# Patient Record
Sex: MALE | Race: WHITE | NOT HISPANIC OR LATINO | Employment: OTHER | URBAN - METROPOLITAN AREA
[De-identification: names, ages, dates, MRNs, and addresses within clinical notes are randomized per-mention and may not be internally consistent; named-entity substitution may affect disease eponyms.]

---

## 2021-03-03 DIAGNOSIS — Z23 NEED FOR VACCINATION: ICD-10-CM

## 2021-10-10 ENCOUNTER — APPOINTMENT (OUTPATIENT)
Dept: RADIOLOGY | Facility: MEDICAL CENTER | Age: 66
End: 2021-10-10
Attending: EMERGENCY MEDICINE
Payer: MEDICARE

## 2021-10-10 ENCOUNTER — HOSPITAL ENCOUNTER (EMERGENCY)
Facility: MEDICAL CENTER | Age: 66
End: 2021-10-10
Attending: EMERGENCY MEDICINE
Payer: MEDICARE

## 2021-10-10 VITALS
DIASTOLIC BLOOD PRESSURE: 78 MMHG | TEMPERATURE: 97.9 F | HEART RATE: 84 BPM | BODY MASS INDEX: 32.18 KG/M2 | WEIGHT: 205.03 LBS | SYSTOLIC BLOOD PRESSURE: 135 MMHG | RESPIRATION RATE: 18 BRPM | OXYGEN SATURATION: 98 % | HEIGHT: 67 IN

## 2021-10-10 DIAGNOSIS — S39.012A STRAIN OF LUMBAR REGION, INITIAL ENCOUNTER: ICD-10-CM
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
## 2021-10-10 DIAGNOSIS — S09.90XA CLOSED HEAD INJURY, INITIAL ENCOUNTER: ICD-10-CM

## 2021-10-10 DIAGNOSIS — S42.294A OTHER CLOSED NONDISPLACED FRACTURE OF PROXIMAL END OF RIGHT HUMERUS, INITIAL ENCOUNTER: ICD-10-CM

## 2021-10-10 PROCEDURE — 70450 CT HEAD/BRAIN W/O DYE: CPT | Mod: ME

## 2021-10-10 PROCEDURE — 700102 HCHG RX REV CODE 250 W/ 637 OVERRIDE(OP): Performed by: EMERGENCY MEDICINE

## 2021-10-10 PROCEDURE — A9270 NON-COVERED ITEM OR SERVICE: HCPCS | Performed by: EMERGENCY MEDICINE

## 2021-10-10 PROCEDURE — 73030 X-RAY EXAM OF SHOULDER: CPT | Mod: RT

## 2021-10-10 PROCEDURE — 99284 EMERGENCY DEPT VISIT MOD MDM: CPT

## 2021-10-10 PROCEDURE — 72131 CT LUMBAR SPINE W/O DYE: CPT | Mod: ME

## 2021-10-10 PROCEDURE — 72125 CT NECK SPINE W/O DYE: CPT | Mod: ME

## 2021-10-10 PROCEDURE — 73060 X-RAY EXAM OF HUMERUS: CPT | Mod: RT

## 2021-10-10 RX ORDER — HYDROCODONE BITARTRATE AND ACETAMINOPHEN 5; 325 MG/1; MG/1
1 TABLET ORAL ONCE
Status: COMPLETED | OUTPATIENT
Start: 2021-10-10 | End: 2021-10-10

## 2021-10-10 RX ADMIN — HYDROCODONE BITARTRATE AND ACETAMINOPHEN 1 TABLET: 5; 325 TABLET ORAL at 21:39

## 2021-10-10 ASSESSMENT — PAIN DESCRIPTION - PAIN TYPE: TYPE: ACUTE PAIN

## 2021-10-11 NOTE — ED TRIAGE NOTES
"Presents via EMS.  Earlier today while carrying groceries in his house, he lost his footing falling on linoleum marianne.  He C/O left shoulder (total joint replacement in 1997) pain, and occipital head I jury.  Chief Complaint   Patient presents with   • T-5000 FALL   • Shoulder Injury   • Head Injury     BP (!) 169/92   Pulse 62   Temp 36.7 °C (98 °F) (Temporal)   Resp 18   Ht 1.702 m (5' 7\")   Wt 93 kg (205 lb 0.4 oz)   SpO2 97%   BMI 32.11 kg/m²   Has this patient been vaccinated for COVID NO  If not, would they like to be vaccinated while in the ER if eligible?  NO  Would the patient like to speak with the ERP about the possibility of receiving the COVID vaccine today before making a decision? NO    "

## 2021-10-11 NOTE — ED PROVIDER NOTES
ED Provider Note  CHIEF COMPLAINT  Chief Complaint   Patient presents with   • T-5000 FALL   • Shoulder Injury   • Head Injury       HPI  Deshawn Butts is a 66 y.o. male who presents to the ER complaining of right shoulder pain, head injury, neck pain and low back pain after a ground-level fall today while carrying groceries into his house.  The patient states he has chronic low back pain due to multiple bulging disc.  Sometimes his leg gives out on him.  He was carrying groceries in for his wife.  His finger got caught on the door handle.  He twisted wrong.  His leg went out from under him.  He fell backwards onto his back, striking his head.  No LOC.  Is not on any blood thinners.  He has a mild headache.  He complains of some neck pain.  He also has disc disease in his neck at baseline.  Additionally, he has history of shoulder replacement surgery.  He recalls when he fell today that his arm went up above his head.  He was told by his orthopedic surgeon that he should never let his arm go above his head.  He arrives in a sling.  EMS was called to the house as he was unable to get up on his own.  Once he was up, however, he was able to walk.  No hip pain.  He says his back is bothering him and it seems to be a little worse than his baseline.  No radiating pain down the legs.  No new numbness, tingling or weakness of the legs.  No loss of bowel or bladder control.  No abdominal pain.  No rib pain.  No trouble breathing.    REVIEW OF SYSTEMS  See HPI for further details. All other systems are negative.    PAST MEDICAL HISTORY  History reviewed. No pertinent past medical history.    FAMILY HISTORY  History reviewed. No pertinent family history.    SOCIAL HISTORY  Social History     Socioeconomic History   • Marital status:      Spouse name: Not on file   • Number of children: Not on file   • Years of education: Not on file   • Highest education level: Not on file   Occupational History   • Not on file  "  Tobacco Use   • Smoking status: Former Smoker   • Smokeless tobacco: Never Used   Substance and Sexual Activity   • Alcohol use: Not Currently   • Drug use: Never   • Sexual activity: Not on file   Other Topics Concern   • Not on file   Social History Narrative   • Not on file     Social Determinants of Health     Financial Resource Strain:    • Difficulty of Paying Living Expenses:    Food Insecurity:    • Worried About Running Out of Food in the Last Year:    • Ran Out of Food in the Last Year:    Transportation Needs:    • Lack of Transportation (Medical):    • Lack of Transportation (Non-Medical):    Physical Activity:    • Days of Exercise per Week:    • Minutes of Exercise per Session:    Stress:    • Feeling of Stress :    Social Connections:    • Frequency of Communication with Friends and Family:    • Frequency of Social Gatherings with Friends and Family:    • Attends Congregation Services:    • Active Member of Clubs or Organizations:    • Attends Club or Organization Meetings:    • Marital Status:    Intimate Partner Violence:    • Fear of Current or Ex-Partner:    • Emotionally Abused:    • Physically Abused:    • Sexually Abused:        SURGICAL HISTORY  History reviewed. No pertinent surgical history.    CURRENT MEDICATIONS  Home Medications     Reviewed by Carlos Hayes R.N. (Registered Nurse) on 10/10/21 at 1803  Med List Status: Not Addressed   Medication Last Dose Status        Patient Poncho Taking any Medications                       ALLERGIES  No Known Allergies    PHYSICAL EXAM  VITAL SIGNS: BP (!) 169/92   Pulse 62   Temp 36.7 °C (98 °F) (Temporal)   Resp 18   Ht 1.702 m (5' 7\")   Wt 93 kg (205 lb 0.4 oz)   SpO2 97%   BMI 32.11 kg/m²      Constitutional: Well developed, well nourished; No acute distress; Non-toxic appearance.   HENT: Normocephalic, atraumatic; Bilateral external ears normal;  Oropharyngeal examination deferred due to COVID-19 outbreak and lack of oropharyngeal " complaint  Eyes: PERRL, EOMI, Conjunctiva normal. No discharge.   Neck:  Supple, nontender midline C-spine without step-off or deformity.  There is some mild bilateral paraspinous muscle tenderness; No stridor; No nuchal rigidity.   Lymphatic: No cervical lymphadenopathy noted.   Cardiovascular: Regular rate and rhythm without murmurs, rubs, or gallop.   Thorax & Lungs: No respiratory distress, breath sounds clear to auscultation bilaterally without wheezing, rales or rhonchi. Nontender chest wall. No crepitus or subcutaneous air.  No ecchymosis to the posterior chest wall or back.  Abdomen: Soft, elevated BMI, nontender, bowel sounds normal. No obvious masses; No pulsatile masses; no rebound, guarding, or peritoneal signs.   Skin: Good color; warm and dry without rash or petechia.  Back: Nontender T-spine.  There is some tenderness in the midline lower lumbar spine.  No ecchymosis.  No CVA tenderness. .   Extremities: Distal radial, dorsalis pedis, posterior tibial pulses are equal bilaterally; No edema; Nontender calves or saphenous, No cyanosis, No clubbing.   Musculoskeletal: Right upper extremity: The right upper extremity is in a sling.  I took the patient out of the sling.  There is no tenderness to the clavicle.  There is tenderness to the anterolateral aspect of the shoulder.  No deformity.  There is tenderness to the proximal humerus.  No obvious ecchymosis or induration seen at the upper arm or shoulder.  Nontender scapula.  No tenderness to the elbow.  There is no pain in the elbow with flexion, extension, pronation or supination.  2+ radial pulse.  Full range of motion of digits.  Sensation is intact to light touch.  Neurologic: Alert & oriented x 4, clear speech      RADIOLOGY/PROCEDURES  CT-LSPINE W/O PLUS RECONS   Final Result      No acute fracture or traumatic listhesis in the lumbar spine.      CT-CSPINE WITHOUT PLUS RECONS   Final Result      No acute fracture or traumatic listhesis in the  "cervical spine.      CT-HEAD W/O   Final Result      No CT evidence of acute infarct, hemorrhage or mass.      DX-SHOULDER 2+ RIGHT   Final Result         1. Acute, obliquely oriented periprosthetic fracture of the right proximal humeral diaphysis.   2. Right shoulder arthroplasty, with extensive erosions involving the proximal humerus, glenoid process and scapula. 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      DX-HUMERUS 2+ RIGHT   Final Result         1. Acute, obliquely oriented periprosthetic fracture of the right proximal humeral diaphysis.   2. Right shoulder arthroplasty, with extensive erosions involving the proximal humerus, glenoid process and scapula. 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          COURSE & MEDICAL DECISION MAKING  Pertinent Labs & Imaging studies reviewed. (See chart for details)    2200: Paged Dr. Kraft      6470: I spoke with Dr. Kraft, orthopedic surgeon on-call.  He agrees with placing patient in a sling.  He will see the patient in consultation in the office.      Patient presents to the ER with complaint of right shoulder pain, exacerbation of chronic neck pain, exacerbation of chronic back pain, and head injury after he fell today while carrying groceries into his house.  He said that his finger got caught on the door handle.  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  This low back problem at baseline occasionally cause his leg to \"give out on him.\"  His leg gave out on him today as he was trying to twist to get his hand out from the door handle.  He fell down, striking his low back and head.  No LOC.  He is not on any blood thinners.  No significant headache.  No vision changes.  No vomiting.  CT brain is negative.  He complained of some exacerbation of his neck pain and lower lumbar back pain.  CT C-spine is negative for any acute fracture.  CT L-spine is negative for any acute fracture.  He was tender over the proximal humerus and over " the anterolateral aspect of the shoulder.  He has a shoulder prosthesis on the right.  X-ray reveals a periprosthetic fracture of the right proximal humerus.  Patient is neurovascular intact.  No dislocation.  Patient be placed in a sling.  I will contact orthopedic surgeon on-call to be sure patient can be seen in office and be sure there is nothing else we need to do given the fracture location with a prosthetic.  At this time patient is safe and stable for outpatient management discharge home.  No other injuries.  Vital signs are normal stable.  Patient has been given strict return precautions and discharge instructions and he understands treatment plan and follow-up.    I verified that the patient was wearing a mask and I was wearing appropriate PPE every time I entered the room. The patient's mask was on the patient at all times during my encounter     FINAL IMPRESSION  .  1. Closed head injury, initial encounter Acute    2. Fall, initial encounter Acute    3. Other closed nondisplaced fracture of proximal end of right humerus, initial encounter Acute    4. Strain of lumbar region, initial encounter Acute         This dictation has been created using voice recognition software. The accuracy of the dictation is limited by the abilities of the software. I expect there may be some errors of grammar and possibly content. I made every attempt to manually correct the errors within my dictation. However, errors related to voice recognition software may still exist and should be interpreted within the appropriate context.    Electronically signed by: Hattie Nolasco M.D., 10/10/2021 9:14 PM

## 2021-10-11 NOTE — DISCHARGE INSTRUCTIONS
Follow-up with Dr. Kraft, orthopedic surgeon, within the next 1 to 2 days.  Please call for appointment.     Return to the ER for any worsening shoulder pain, numbness or tingling into the hand, discoloration of the hand, headache, vision changes, nausea, vomiting, lethargy, sense of being off balance, worsening dizziness, worsening low back pain, worsening numbness/tingling/weakness of your legs, loss of bowel or bladder control, blood in urine, abdominal pain, or for any concerns.      Use ice to help with the pain.    Continue your home tramadol and celebrex as prescribed.

## 2025-06-24 NOTE — ED NOTES
Patient is stable for discharge at this time, anticipatory guidance provided, close follow-up is encouraged, and ED return instructions have been detailed. Patient is both agreeable to the disposition and plan and discharged home in ambulatory state and in good condition.        
diarrhea x 1 week